# Patient Record
Sex: FEMALE | Race: WHITE | NOT HISPANIC OR LATINO | Employment: UNEMPLOYED | URBAN - METROPOLITAN AREA
[De-identification: names, ages, dates, MRNs, and addresses within clinical notes are randomized per-mention and may not be internally consistent; named-entity substitution may affect disease eponyms.]

---

## 2017-11-14 ENCOUNTER — HOSPITAL ENCOUNTER (EMERGENCY)
Facility: OTHER | Age: 42
Discharge: HOME | End: 2017-11-14
Attending: EMERGENCY MEDICINE
Payer: MEDICAID

## 2017-11-14 VITALS
HEIGHT: 69 IN | SYSTOLIC BLOOD PRESSURE: 133 MMHG | WEIGHT: 293 LBS | RESPIRATION RATE: 17 BRPM | BODY MASS INDEX: 43.4 KG/M2 | TEMPERATURE: 98 F | OXYGEN SATURATION: 97 % | DIASTOLIC BLOOD PRESSURE: 54 MMHG | HEART RATE: 90 BPM

## 2017-11-14 DIAGNOSIS — M25.569 KNEE PAIN: ICD-10-CM

## 2017-11-14 DIAGNOSIS — M17.11 OSTEOARTHRITIS OF RIGHT KNEE, UNSPECIFIED OSTEOARTHRITIS TYPE: Primary | ICD-10-CM

## 2017-11-14 PROCEDURE — 99283 EMERGENCY DEPT VISIT LOW MDM: CPT | Mod: 25

## 2017-11-14 PROCEDURE — 63600175 PHARM REV CODE 636 W HCPCS: Performed by: EMERGENCY MEDICINE

## 2017-11-14 PROCEDURE — 96372 THER/PROPH/DIAG INJ SC/IM: CPT

## 2017-11-14 RX ORDER — DIPHENHYDRAMINE HCL 50 MG
50 CAPSULE ORAL EVERY 6 HOURS PRN
COMMUNITY

## 2017-11-14 RX ORDER — ROSUVASTATIN CALCIUM 20 MG/1
40 TABLET, COATED ORAL DAILY
COMMUNITY

## 2017-11-14 RX ORDER — GABAPENTIN 400 MG/1
400 CAPSULE ORAL 3 TIMES DAILY
COMMUNITY

## 2017-11-14 RX ORDER — PREDNISONE 20 MG/1
TABLET ORAL
Qty: 10 TABLET | Refills: 0 | Status: SHIPPED | OUTPATIENT
Start: 2017-11-14

## 2017-11-14 RX ORDER — PANTOPRAZOLE SODIUM 40 MG/1
40 TABLET, DELAYED RELEASE ORAL DAILY
COMMUNITY

## 2017-11-14 RX ORDER — FUROSEMIDE 40 MG/1
20 TABLET ORAL DAILY
COMMUNITY

## 2017-11-14 RX ORDER — POTASSIUM CHLORIDE 750 MG/1
10 TABLET, EXTENDED RELEASE ORAL ONCE
COMMUNITY

## 2017-11-14 RX ORDER — ERGOCALCIFEROL 1.25 MG/1
50000 CAPSULE ORAL
COMMUNITY

## 2017-11-14 RX ORDER — CHLORTHALIDONE 50 MG/1
TABLET ORAL DAILY
COMMUNITY

## 2017-11-14 RX ORDER — LOSARTAN POTASSIUM 100 MG/1
100 TABLET ORAL DAILY
COMMUNITY

## 2017-11-14 RX ORDER — DOCUSATE SODIUM 250 MG
250 CAPSULE ORAL DAILY
COMMUNITY

## 2017-11-14 RX ORDER — HYDROCODONE BITARTRATE AND ACETAMINOPHEN 7.5; 325 MG/1; MG/1
1 TABLET ORAL EVERY 8 HOURS PRN
Qty: 15 TABLET | Refills: 0 | Status: SHIPPED | OUTPATIENT
Start: 2017-11-14

## 2017-11-14 RX ORDER — BUPROPION HYDROCHLORIDE 300 MG/1
300 TABLET ORAL DAILY
COMMUNITY

## 2017-11-14 RX ORDER — TALC
3 POWDER (GRAM) TOPICAL 3 TIMES DAILY
COMMUNITY

## 2017-11-14 RX ORDER — MELOXICAM 7.5 MG/1
7.5 TABLET ORAL 2 TIMES DAILY
COMMUNITY

## 2017-11-14 RX ORDER — FLUOXETINE HYDROCHLORIDE 40 MG/1
60 CAPSULE ORAL DAILY
COMMUNITY

## 2017-11-14 RX ORDER — KETOROLAC TROMETHAMINE 30 MG/ML
60 INJECTION, SOLUTION INTRAMUSCULAR; INTRAVENOUS
Status: COMPLETED | OUTPATIENT
Start: 2017-11-14 | End: 2017-11-14

## 2017-11-14 RX ADMIN — KETOROLAC TROMETHAMINE 60 MG: 60 INJECTION, SOLUTION INTRAMUSCULAR at 10:11

## 2017-11-14 NOTE — ED PROVIDER NOTES
Encounter Date: 11/14/2017       History     Chief Complaint   Patient presents with    Knee Pain     right knee pain x a while; pt reports she has seen MD and had physical therapy for it before, pain is just getting worse, pt with steady gait     42-year-old morbidly obese female reports right knee pain chronically, but the pain has been worse over the past month.  No recent injuries.  She describes the pain as being deep into the joint.  She reports she's been seen by orthopedist for this in the past and underwent physical therapy.  She also reports she takes Mobitz 7.5 mg twice a day without any relief of her pain.  Denies receiving any injections into the knee, reports last plane film was quite some time ago.          Review of patient's allergies indicates:   Allergen Reactions    Codeine     Iodine and iodide containing products      Past Medical History:   Diagnosis Date    Arthritis     Depression     Fibromyalgia     Gout     Hyperlipemia     Hypertension     Numbness     Plantar fasciitis     Potassium (K) deficiency     Risk for falls     Rosacea     Silent aspiration     Sleep apnea     Vitamin D deficiency      Past Surgical History:   Procedure Laterality Date    BREAST LUMPECTOMY  2000    right breast    HYSTERECTOMY      partial     History reviewed. No pertinent family history.  Social History   Substance Use Topics    Smoking status: Never Smoker    Smokeless tobacco: Never Used    Alcohol use No     Review of Systems   Constitutional: Negative for chills and fever.   Musculoskeletal: Positive for arthralgias and gait problem. Negative for myalgias.   Skin: Negative for color change and wound.       Physical Exam     Initial Vitals [11/14/17 0940]   BP Pulse Resp Temp SpO2   (!) 133/54 90 17 97.9 °F (36.6 °C) 97 %      MAP       80.33         Physical Exam    Nursing note and vitals reviewed.  Constitutional: Vital signs are normal. She appears well-developed and  well-nourished. She is cooperative.   HENT:   Head: Normocephalic and atraumatic.   Cardiovascular: Normal rate.   Pulses:       Dorsalis pedis pulses are 2+ on the right side, and 2+ on the left side.   Trace pitting edema bilateral lower extremities   Musculoskeletal:        Right hip: Normal.        Left hip: Normal.        Right knee: She exhibits decreased range of motion and swelling. She exhibits normal alignment.        Left knee: Normal.        Right ankle: Normal.        Left ankle: Normal.   Right knee flexion to 90°, full extension.  No laxity with varus or valgus stress.  No joint line tenderness.  No laxity of the anterior posterior cruciate ligaments.   Neurological: She is alert and oriented to person, place, and time. She has normal strength. No sensory deficit. Gait abnormal.   Strength flexion and extension of bilateral hips and ankles 5 out of 5.  Gait mildly antalgic.  Sensation intact bilateral lower extremities   Skin: Skin is warm, dry and intact.         ED Course   Procedures  Labs Reviewed - No data to display          Medical Decision Making:   Initial Assessment:   Right knee pain worsening for one month  Differential Diagnosis:   Arthritis; ligamentous injury  ED Management:  Right  knee radiographs    Results: AP, lateral and patellar view. The alignment appears normal.  Moderate medial knee compartment joint space narrowing.  No osteophyte formation.  No geodes. The femoral patellar joint appears normal. No fracture or subluxation.  No osseous lesions.  Small joint effusion.  The soft tissues appear normal.  Impression:     Mild degenerative change.  Small joint effusion    Electronically signed by: MARVIN KINSEY MD  Date: 11/14/17  Time: 11:12     X-ray results and plan a care discussed with patient.  I will give the patient short course of prednisone 40 mg a day for 2 days tapering down to 10 mg a day over 1 week.  We'll also give a short course of Norco 7.5/325 mg.  Advised  patient follow up with [facility] or [facility]opedic surgeon for further evaluation of this knee pain.  She may be a candidate for steroid worsen this injections, but due to body habitus and do not look comfortable initiating intra-articular steroids on this patient.                   ED Course      Clinical Impression:   The primary encounter diagnosis was Osteoarthritis of right knee, unspecified osteoarthritis type. A diagnosis of Knee pain was also pertinent to this visit.    Disposition:   Disposition: Discharged  Condition: Stable                        Olena Katz MD  11/14/17 8394

## 2017-11-14 NOTE — ED TRIAGE NOTES
"Pt reports knee pain is chronic, sees MD at the [facility] for it including ortho and has also had PT for it, states her MD told her there was nothing wrong with it and it was her "depression", pt with steady gait, states pain is worse with ambulation, wants to see an MD because she "can't take the pain anymore"  "

## 2017-11-14 NOTE — ED TRIAGE NOTES
Pt states she has chronic knee pain and c/o pain when ambulation. Pt has steady gait but is painful when ambulating.    Pt denies any other associated symptoms at this time.

## 2017-11-14 NOTE — DISCHARGE INSTRUCTIONS
Prednisone 20 mg tablets - take 2 tablets by mouth daily for 2 days then 1-1/2 tablets by mouth daily for 2 days then 1 tablet by mouth daily for 3 days.    Norco 7.5/325 mg 1 by mouth every 8 hours as needed for pain not responding to meloxicam.    Ice to the medial aspect of the knee for 15 minutes at a time every 4 hours may help.    Continue your routine home medications as prescribed.    Contact your [facility] primary care provider or your orthopedist for further evaluation of your knee pain.    Return as needed.

## 2018-02-02 ENCOUNTER — HOSPITAL ENCOUNTER (EMERGENCY)
Facility: OTHER | Age: 43
Discharge: HOME | End: 2018-02-02
Attending: EMERGENCY MEDICINE
Payer: MEDICAID

## 2018-02-02 VITALS
OXYGEN SATURATION: 99 % | HEART RATE: 80 BPM | HEIGHT: 69 IN | TEMPERATURE: 98 F | SYSTOLIC BLOOD PRESSURE: 149 MMHG | RESPIRATION RATE: 16 BRPM | WEIGHT: 293 LBS | BODY MASS INDEX: 43.4 KG/M2 | DIASTOLIC BLOOD PRESSURE: 80 MMHG

## 2018-02-02 DIAGNOSIS — S93.402A SPRAIN OF LEFT ANKLE, UNSPECIFIED LIGAMENT, INITIAL ENCOUNTER: ICD-10-CM
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
## 2018-02-02 DIAGNOSIS — S90.512A ABRASION OF LEFT ANKLE, INITIAL ENCOUNTER: Primary | ICD-10-CM
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
## 2018-02-02 DIAGNOSIS — S80.812A ABRASION OF LEFT LOWER EXTREMITY, INITIAL ENCOUNTER: ICD-10-CM

## 2018-02-02 PROCEDURE — 25000003 PHARM REV CODE 250: Performed by: NURSE PRACTITIONER

## 2018-02-02 PROCEDURE — 99284 EMERGENCY DEPT VISIT MOD MDM: CPT | Mod: 25

## 2018-02-02 PROCEDURE — 25000003 PHARM REV CODE 250: Performed by: EMERGENCY MEDICINE

## 2018-02-02 PROCEDURE — 63600175 PHARM REV CODE 636 W HCPCS: Performed by: EMERGENCY MEDICINE

## 2018-02-02 PROCEDURE — 90471 IMMUNIZATION ADMIN: CPT | Performed by: EMERGENCY MEDICINE

## 2018-02-02 PROCEDURE — 90715 TDAP VACCINE 7 YRS/> IM: CPT | Performed by: EMERGENCY MEDICINE

## 2018-02-02 PROCEDURE — 96372 THER/PROPH/DIAG INJ SC/IM: CPT

## 2018-02-02 RX ORDER — KETOROLAC TROMETHAMINE 30 MG/ML
30 INJECTION, SOLUTION INTRAMUSCULAR; INTRAVENOUS
Status: COMPLETED | OUTPATIENT
Start: 2018-02-02 | End: 2018-02-02

## 2018-02-02 RX ORDER — METHOCARBAMOL 750 MG/1
1500 TABLET, FILM COATED ORAL 3 TIMES DAILY
Qty: 30 TABLET | Refills: 0 | Status: SHIPPED | OUTPATIENT
Start: 2018-02-02 | End: 2018-02-07

## 2018-02-02 RX ORDER — ACETAMINOPHEN 500 MG
1000 TABLET ORAL EVERY 6 HOURS PRN
Status: DISCONTINUED | OUTPATIENT
Start: 2018-02-02 | End: 2018-02-02 | Stop reason: HOSPADM

## 2018-02-02 RX ORDER — ACETAMINOPHEN 500 MG
500 TABLET ORAL EVERY 6 HOURS PRN
Qty: 30 TABLET | Refills: 0 | Status: SHIPPED | OUTPATIENT
Start: 2018-02-02

## 2018-02-02 RX ORDER — MUPIROCIN 20 MG/G
1 OINTMENT TOPICAL
Status: COMPLETED | OUTPATIENT
Start: 2018-02-02 | End: 2018-02-02

## 2018-02-02 RX ORDER — MUPIROCIN 20 MG/G
OINTMENT TOPICAL 3 TIMES DAILY
Qty: 1 TUBE | Refills: 0 | Status: SHIPPED | OUTPATIENT
Start: 2018-02-02 | End: 2018-02-12

## 2018-02-02 RX ORDER — NAPROXEN 500 MG/1
500 TABLET ORAL 2 TIMES DAILY WITH MEALS
Qty: 30 TABLET | Refills: 0 | Status: SHIPPED | OUTPATIENT
Start: 2018-02-02

## 2018-02-02 RX ADMIN — MUPIROCIN 22 G: 20 OINTMENT TOPICAL at 02:02

## 2018-02-02 RX ADMIN — CLOSTRIDIUM TETANI TOXOID ANTIGEN (FORMALDEHYDE INACTIVATED), CORYNEBACTERIUM DIPHTHERIAE TOXOID ANTIGEN (FORMALDEHYDE INACTIVATED), BORDETELLA PERTUSSIS TOXOID ANTIGEN (GLUTARALDEHYDE INACTIVATED), BORDETELLA PERTUSSIS FILAMENTOUS HEMAGGLUTININ ANTIGEN (FORMALDEHYDE INACTIVATED), BORDETELLA PERTUSSIS PERTACTIN ANTIGEN, AND BORDETELLA PERTUSSIS FIMBRIAE 2/3 ANTIGEN 0.5 ML: 5; 2; 2.5; 5; 3; 5 INJECTION, SUSPENSION INTRAMUSCULAR at 02:02

## 2018-02-02 RX ADMIN — ACETAMINOPHEN 1000 MG: 500 TABLET ORAL at 01:02

## 2018-02-02 RX ADMIN — KETOROLAC TROMETHAMINE 30 MG: 30 INJECTION, SOLUTION INTRAMUSCULAR at 02:02

## 2018-02-09 NOTE — ED PROVIDER NOTES
"Encounter Date: 2/2/2018       History     Chief Complaint   Patient presents with    Ankle Injury     pt states "I fell down my stairs at home this morning and now my left ankle and left knee is swollen and hurting"    Knee Injury     Lyssa Sepulveda is a 42 y.o. female who presents to the Emergency Department with  left ankle and knee pain.  She reports throbbing pain in her lower leg after falling down stairs at home.  Patient does not recall her last tetanus      The history is provided by the patient.   Fall   The accident occurred today. The fall occurred while walking. She fell from a height of 3 to 5 ft. She landed on a hard floor. The point of impact was the left knee. The pain is at a severity of 8/10. She was ambulatory at the scene. There was no entrapment after the fall. There was no drug use involved in the accident. There was no alcohol use involved in the accident. Pertinent negatives include no neck pain, no back pain, no fever, no numbness, no abdominal pain, no nausea, no headaches and no loss of consciousness. The symptoms are aggravated by activity and pressure on the injury. She has tried nothing for the symptoms. The treatment provided no relief.     Review of patient's allergies indicates:   Allergen Reactions    Bactrim [sulfamethoxazole-trimethoprim] Other (See Comments)     Nausea, headache and dizziness    Codeine     Iodine and iodide containing products      Past Medical History:   Diagnosis Date    Arthritis     Depression     Fibromyalgia     Gout     Hyperlipemia     Hypertension     Numbness     Plantar fasciitis     Potassium (K) deficiency     Risk for falls     Rosacea     Silent aspiration     Sleep apnea     Vitamin D deficiency      Past Surgical History:   Procedure Laterality Date    BREAST LUMPECTOMY  2000    right breast    HYSTERECTOMY      partial     History reviewed. No pertinent family history.  Social History   Substance Use Topics    " Smoking status: Never Smoker    Smokeless tobacco: Never Used    Alcohol use No     Review of Systems   Constitutional: Negative for fever.   HENT: Negative for sore throat.    Respiratory: Negative for shortness of breath.    Cardiovascular: Negative for chest pain.   Gastrointestinal: Negative for abdominal pain and nausea.   Genitourinary: Negative for dysuria.   Musculoskeletal: Positive for arthralgias and gait problem. Negative for back pain and neck pain.   Skin: Negative for rash.   Neurological: Negative for loss of consciousness, weakness, numbness and headaches.   Hematological: Does not bruise/bleed easily.   All other systems reviewed and are negative.      Physical Exam     Initial Vitals [02/02/18 1300]   BP Pulse Resp Temp SpO2   (!) 157/83 89 18 97.8 °F (36.6 °C) 97 %      MAP       107.67         Physical Exam    Nursing note and vitals reviewed.  Constitutional: She appears well-developed and well-nourished.   HENT:   Head: Normocephalic and atraumatic.   Right Ear: External ear normal.   Left Ear: External ear normal.   Nose: Nose normal.   Eyes: Conjunctivae and EOM are normal. Pupils are equal, round, and reactive to light. Right eye exhibits no discharge. Left eye exhibits no discharge.   Neck: Normal range of motion. Neck supple.   Cardiovascular: Normal rate, regular rhythm, normal heart sounds and intact distal pulses. Exam reveals no gallop and no friction rub.    No murmur heard.  Pulmonary/Chest: Breath sounds normal. No respiratory distress. She has no wheezes. She has no rhonchi. She has no rales. She exhibits no tenderness.   Abdominal: Soft. Bowel sounds are normal. She exhibits no distension and no mass. There is no tenderness. There is no rebound and no guarding.   Musculoskeletal: She exhibits edema and tenderness.        Right hip: Normal. She exhibits normal range of motion, normal strength and no tenderness.        Left hip: Normal. She exhibits normal range of motion, normal  strength, no tenderness and no bony tenderness.        Right knee: Normal. She exhibits normal range of motion. No tenderness found. No medial joint line and no lateral joint line tenderness noted.        Left knee: She exhibits decreased range of motion and swelling. Tenderness found.        Right ankle: Normal. She exhibits normal range of motion and no swelling. No tenderness. No lateral malleolus and no medial malleolus tenderness found.        Left ankle: She exhibits swelling. Tenderness.   Lymphadenopathy:     She has no cervical adenopathy.   Neurological: She is alert and oriented to person, place, and time. She has normal strength and normal reflexes. She displays normal reflexes. No cranial nerve deficit or sensory deficit.   Skin: Skin is warm and dry. Capillary refill takes less than 2 seconds. Abrasion noted. No rash noted. No pallor.   Psychiatric: She has a normal mood and affect.         ED Course   Procedures  Labs Reviewed - No data to display     Imaging Results          X-Ray Knee 3 View Left (Final result)  Result time 02/02/18 13:23:08    Final result by Matt Mckenzie MD (02/02/18 13:23:08)                 Impression:        No acute bony abnormality.      Electronically signed by: Matt Mckenzie  Date:     02/02/18  Time:    13:23              Narrative:    COMPARISON: None.    FINDINGS: Three views of the left knee and two views of the left tibia/fibula.  No acute fracture or dislocation.  Prominent soft tissues, without definite focal abnormality.  No radiopaque foreign body.                             X-Ray Tibia Fibula 2 View Left (Final result)  Result time 02/02/18 13:23:07    Final result by Matt Mckenzie MD (02/02/18 13:23:07)                 Impression:        No acute bony abnormality.      Electronically signed by: Matt Mckenzie  Date:     02/02/18  Time:    13:23              Narrative:    COMPARISON: None.    FINDINGS: Three views of the left knee and two views of the  left tibia/fibula.  No acute fracture or dislocation.  Prominent soft tissues, without definite focal abnormality.  No radiopaque foreign body.                             X-Ray Ankle Complete Left (Final result)  Result time 02/02/18 13:24:13    Final result by Matt Mckenzie MD (02/02/18 13:24:13)                 Impression:        No acute bony abnormality.      Electronically signed by: Matt Mckenzie  Date:     02/02/18  Time:    13:24              Narrative:    COMPARISON: None.    FINDINGS: Three views of the left ankle.  No acute fracture or dislocation.  Prominent soft tissues, without asymmetric focal abnormality.  No radiopaque foreign body.                             X-Ray Foot Complete Left (Final result)  Result time 02/02/18 13:25:04    Final result by Matt Mckenzie MD (02/02/18 13:25:04)                 Impression:        No acute bony abnormality.      Electronically signed by: Matt Mckenzie  Date:     02/02/18  Time:    13:25              Narrative:    COMPARISON: None.    FINDINGS: Three views of the left foot.  No acute fracture or dislocation.  Soft tissues are symmetric.  No radiopaque foreign body.                                                     ED Course        Medical decision making   Chief complaint: Trip and fall with left ankle and knee pain  Differential diagnosis: Strain, sprain, contusion, and fracture.    Treatment in the ED Physical Exam, Toradol, Bactrim for abrasions, and tetanus update  Patient reports total resolution of symptoms after medication.    Discussed labs, and imaging results.    Fill and take prescriptions as directed.  Return to the ED if symptoms worsen or do not resolve.   Answered questions and discussed discharge plan.    Patient feels much better and is ready for discharge.  Follow up with PCP in 1 days.    Clinical Impression:   The primary encounter diagnosis was Abrasion of left ankle, initial encounter. Diagnoses of Fall, Fall (on)(from)  sidewalk curb, initial encounter, Abrasion of left lower extremity, initial encounter, and Sprain of left ankle, unspecified ligament, initial encounter were also pertinent to this visit.                           Norma Fraser DO  02/10/18 1837

## 2023-01-18 ENCOUNTER — HOSPITAL ENCOUNTER (EMERGENCY)
Facility: HOSPITAL | Age: 48
Discharge: HOME | End: 2023-01-18
Attending: INTERNAL MEDICINE
Payer: MEDICAID

## 2023-01-18 VITALS
HEART RATE: 88 BPM | DIASTOLIC BLOOD PRESSURE: 89 MMHG | OXYGEN SATURATION: 100 % | SYSTOLIC BLOOD PRESSURE: 163 MMHG | RESPIRATION RATE: 17 BRPM | WEIGHT: 293 LBS | TEMPERATURE: 98 F | BODY MASS INDEX: 43.4 KG/M2 | HEIGHT: 69 IN

## 2023-01-18 DIAGNOSIS — M25.511 RIGHT SHOULDER PAIN: ICD-10-CM

## 2023-01-18 PROCEDURE — 25000003 PHARM REV CODE 250: Mod: ER | Performed by: INTERNAL MEDICINE

## 2023-01-18 PROCEDURE — 99283 EMERGENCY DEPT VISIT LOW MDM: CPT | Mod: ER

## 2023-01-18 RX ORDER — METHOCARBAMOL 750 MG/1
1500 TABLET, FILM COATED ORAL 3 TIMES DAILY
Qty: 30 TABLET | Refills: 0 | Status: SHIPPED | OUTPATIENT
Start: 2023-01-18 | End: 2023-01-23

## 2023-01-18 RX ORDER — METHOCARBAMOL 750 MG/1
1500 TABLET, FILM COATED ORAL
Status: COMPLETED | OUTPATIENT
Start: 2023-01-18 | End: 2023-01-18

## 2023-01-18 RX ADMIN — METHOCARBAMOL 1500 MG: 750 TABLET ORAL at 10:01

## 2023-01-18 NOTE — Clinical Note
"Lyssa Camacho" Coco was seen and treated in our emergency department on 1/18/2023.  She may return to school on 01/20/2023.      If you have any questions or concerns, please don't hesitate to call.      MB RN"
Detail Level: Simple
Instructions: This plan will send the code FBSD to the PM system.  DO NOT or CHANGE the price.
Price (Do Not Change): 0.00

## 2023-01-19 NOTE — ED PROVIDER NOTES
Encounter Date: 1/18/2023    SCRIBE #1 NOTE: IKaur, am scribing for, and in the presence of,  Robbie Wen MD.     History     Chief Complaint   Patient presents with    Shoulder Pain     PT REPORTS RIGHT SHOULDER PAIN RADIATING TO POSTERIOR NECK, PT REPORTS TAKING TRAMADOL AND TYLENOL WITHOUT RELIEF     47 y.o. female with PMHx of Arthritis, HTN, HLD, and others who presents to the ED for chief complaint of sharp right sided shoulder pain worsening since going to bed last night. Patient reports sleeping on her side and waking up throughout the night with worsening shoulder pain. She endorses pain radiating up her neck with no radiation of pain down her arm. Pain is exacerbated with movement of her neck. She notes limited neck ROM. Denies any known falls or injury. Patient attempted treatment with Tramadol (last dose 7PM) and Tylenol (last dose 8PM). Denies other associated symptoms, numbness, or tingling. Other pertinent PMHx of CKD stage 4 and left nephrectomy secondary to cancer. Drug allergy to Codeine.    The history is provided by the patient. No  was used.   Review of patient's allergies indicates:   Allergen Reactions    Bactrim [sulfamethoxazole-trimethoprim] Other (See Comments)     Nausea, headache and dizziness    Codeine     Iodine and iodide containing products      PT REPORTS NOT ALLERGIC TO IODINE BUT IS ALLERGIC TO SEAFOOD, REPORTS CAN HAVE IODINE     Past Medical History:   Diagnosis Date    Arthritis     Cancer     Depression     Fibromyalgia     Gout     Hyperlipemia     Hypertension     Numbness     Plantar fasciitis     Potassium (K) deficiency     Risk for falls     Rosacea     Silent aspiration     Sleep apnea     Vitamin D deficiency      Past Surgical History:   Procedure Laterality Date    BREAST LUMPECTOMY  2000    right breast    HYSTERECTOMY      partial    NEPHRECTOMY Left      No family history on file.  Social History     Tobacco Use    Smoking status:  Never    Smokeless tobacco: Never   Substance Use Topics    Alcohol use: No    Drug use: No     Review of Systems   Constitutional:  Negative for fever.   HENT:  Negative for sore throat.    Respiratory:  Negative for shortness of breath.    Cardiovascular:  Negative for chest pain.   Gastrointestinal:  Negative for abdominal pain, diarrhea, nausea and vomiting.   Genitourinary:  Negative for dysuria.   Musculoskeletal:  Positive for arthralgias (R shoulder). Negative for back pain.   Skin:  Negative for rash.   Neurological:  Negative for weakness, numbness and headaches.        (-) Tingling.   Psychiatric/Behavioral:  Negative for behavioral problems.    All other systems reviewed and are negative.    Physical Exam     Initial Vitals [01/18/23 2047]   BP Pulse Resp Temp SpO2   (!) 185/96 91 20 98.3 °F (36.8 °C) 97 %      MAP       --         Physical Exam    Nursing note and vitals reviewed.  Constitutional: She appears well-developed and well-nourished.   Generally obese.    HENT:   Head: Normocephalic and atraumatic.   Eyes: Conjunctivae are normal.   Neck: Neck supple.   Normal range of motion.  Cardiovascular:  Normal rate, regular rhythm and normal heart sounds.     Exam reveals no gallop and no friction rub.       No murmur heard.  Pulmonary/Chest: Breath sounds normal. No respiratory distress. She has no wheezes. She has no rhonchi. She has no rales.   Abdominal: Abdomen is soft. There is no abdominal tenderness.   Musculoskeletal:         General: No edema. Normal range of motion.      Cervical back: Normal range of motion and neck supple.      Comments: R shoulder pain upon movement. No tenderness to palpation. BUE neurovascularly intact.      Neurological: She is alert and oriented to person, place, and time. GCS score is 15. GCS eye subscore is 4. GCS verbal subscore is 5. GCS motor subscore is 6.   Skin: Skin is warm and dry.   Psychiatric: She has a normal mood and affect.       ED Course    Procedures  Labs Reviewed - No data to display       Imaging Results              X-Ray Shoulder Complete 2 View Right (Final result)  Result time 01/18/23 22:26:33      Final result by Leidy Quezada MD (01/18/23 22:26:33)                   Impression:      No acute abnormality.      Electronically signed by: Leidy Quezada  Date:    01/18/2023  Time:    22:26               Narrative:    EXAMINATION:  XR SHOULDER COMPLETE 2 OR MORE VIEWS RIGHT    CLINICAL HISTORY:  Pain in right shoulder    TECHNIQUE:  Two or three views of the right shoulder were performed.    COMPARISON:  None    FINDINGS:  There is no acute fracture or dislocation.  Degenerative changes at the AC joint.  Soft tissues are unremarkable.  Visualized right ribs are unremarkable.                                       Medications   methocarbamoL tablet 1,500 mg (1,500 mg Oral Given 1/18/23 2232)     Medical Decision Making:   History:   Old Medical Records: I decided to obtain old medical records.  Initial Assessment:   47 y.o. female with PMHx of Arthritis, HTN, HLD, and others who presents to the ED for chief complaint of sharp right sided shoulder pain worsening since going to bed last night. Patient reports sleeping on her side and waking up throughout the night with worsening shoulder pain. She endorses pain radiating up her neck with no radiation of pain down her arm. Pain is exacerbated with movement of her neck. She notes limited neck ROM. Denies any known falls or injury. Patient attempted treatment with Tramadol (last dose 7PM) and Tylenol (last dose 8PM). Denies other associated symptoms, numbness, or tingling. Other pertinent PMHx of CKD stage 4 and left nephrectomy secondary to cancer. Drug allergy to Codeine.  Clinical Tests:   Radiological Study: Ordered and Reviewed  ED Management:  Right shoulder x-ray reveals no acute abnormalities.  Patient was given instructions for right shoulder pain and received a prescription for  Robaxin.  She was advised to follow-up with her primary care physician within the next week for re-evaluation/return to the emergency department if condition worsens.        Scribe Attestation:   Scribe #1: I performed the above scribed service and the documentation accurately describes the services I performed. I attest to the accuracy of the note.                 This document was produced by a scribe under my direction and in my presence. I agree with the content of the note and have made any necessary edits.     Dr. Wen    01/19/2023 1:30 AM    Clinical Impression:   Final diagnoses:  [M25.511] Right shoulder pain        ED Disposition Condition    Discharge           ED Prescriptions       Medication Sig Dispense Start Date End Date Auth. Provider    methocarbamoL (ROBAXIN) 750 MG Tab Take 2 tablets (1,500 mg total) by mouth 3 (three) times daily. for 5 days 30 tablet 1/18/2023 1/23/2023 Robbie Wen MD          Follow-up Information    None          Robbie Wen MD  01/19/23 0134

## 2024-02-06 ENCOUNTER — HOSPITAL ENCOUNTER (EMERGENCY)
Facility: HOSPITAL | Age: 49
Discharge: HOME | End: 2024-02-06
Attending: STUDENT IN AN ORGANIZED HEALTH CARE EDUCATION/TRAINING PROGRAM
Payer: MEDICAID

## 2024-02-06 VITALS
WEIGHT: 293 LBS | BODY MASS INDEX: 43.4 KG/M2 | DIASTOLIC BLOOD PRESSURE: 90 MMHG | OXYGEN SATURATION: 98 % | TEMPERATURE: 98 F | SYSTOLIC BLOOD PRESSURE: 194 MMHG | HEIGHT: 69 IN | RESPIRATION RATE: 18 BRPM | HEART RATE: 92 BPM
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
## 2024-02-06 DIAGNOSIS — S46.912A STRAIN OF LEFT SHOULDER, INITIAL ENCOUNTER: ICD-10-CM

## 2024-02-06 PROBLEM — E11.9 DIABETES MELLITUS: Chronic | Status: ACTIVE | Noted: 2024-02-06

## 2024-02-06 PROBLEM — I10 BENIGN ESSENTIAL HYPERTENSION: Status: ACTIVE | Noted: 2024-02-06

## 2024-02-06 PROBLEM — M54.9 CHRONIC BACK PAIN: Status: ACTIVE | Noted: 2024-02-06

## 2024-02-06 PROBLEM — N18.30 CKD (CHRONIC KIDNEY DISEASE) STAGE 3, GFR 30-59 ML/MIN: Chronic | Status: ACTIVE | Noted: 2019-11-14

## 2024-02-06 PROBLEM — E66.01 MORBID OBESITY: Status: ACTIVE | Noted: 2024-02-06

## 2024-02-06 PROBLEM — K59.09 CHRONIC CONSTIPATION: Status: ACTIVE | Noted: 2024-02-06

## 2024-02-06 PROBLEM — M1A.0720 CHRONIC IDIOPATHIC GOUT INVOLVING TOE OF LEFT FOOT WITHOUT TOPHUS: Chronic | Status: ACTIVE | Noted: 2024-02-06

## 2024-02-06 PROBLEM — G89.29 CHRONIC BACK PAIN: Status: ACTIVE | Noted: 2024-02-06

## 2024-02-06 PROBLEM — F41.9 ANXIETY: Chronic | Status: ACTIVE | Noted: 2024-02-06

## 2024-02-06 PROCEDURE — 99281 EMR DPT VST MAYX REQ PHY/QHP: CPT
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
## 2024-02-07 NOTE — DISCHARGE INSTRUCTIONS
Thank you for coming to our Emergency Department today. It is important to remember that some problems or medical conditions are difficult to diagnose and may not be found or addressed during your Emergency Department visit.  These conditions often start with non-specific symptoms and can only be diagnosed on follow up visits with your primary care physician or specialist when the symptoms continue or change. Please remember that all medical conditions can change, and we cannot predict how you will be feeling tomorrow or the next day. Return to the ER with any questions/concerns, new/concerning symptoms, worsening or failure to improve.       Be sure to follow up with your primary care doctor and review all labs/imaging/tests that were performed during your ER visit with them. It is very common for us to identify non-emergent incidental findings which must be followed up with your primary care physician.  Some labs/imaging/tests may be outside of the normal range, and require non-emergent follow-up and/or further investigation/treatment/procedures/testing to help diagnose/exclude/prevent complications or other potentially serious medical conditions. Some abnormalities may not have been discussed or addressed during your ER visit.     An ER visit does not replace a primary care visit, and many screening tests or follow-up tests cannot be ordered by an ER doctor or performed by the ER. Some tests may even require pre-approval.    If you do not have a primary care doctor, you may contact the one listed on your discharge paperwork or you may also call the [facility] Appointment Desk at [phone] , or [address] [place] [address] at  [phone] to schedule an appointment, or establish care with a primary care doctor or even a specialist and to obtain information about local resources. It is important to your health that you have a primary care doctor.    Please take all medications as directed. We have done our best to select  a medication for you that will treat your condition however, all medications may potentially have side-effects and it is impossible to predict which medications may give you side-effects or what those side-effects (if any) those medications may give you.  If you feel that you are having a negative effect or side-effect of any medication you should stop taking those medications immediately and seek medical attention. If you feel that you are having a life-threatening reaction call 911.        Do not drive, swim, climb to height, take a bath, operate heavy machinery, drink alcohol or take potentially sedating medications, sign any legal documents or make any important decisions for 24 hours if you have received any pain medications, sedatives or mood altering drugs during your ER visit or within 24 hours of taking them if they have been prescribed to you.     You can find additional resources for Dentists, hearing aids, durable medical equipment, low cost pharmacies and other resources at [email or web address]

## 2024-12-03 ENCOUNTER — HOSPITAL ENCOUNTER (EMERGENCY)
Facility: HOSPITAL | Age: 49
Discharge: HOME | End: 2024-12-03
Attending: EMERGENCY MEDICINE
Payer: MEDICAID

## 2024-12-03 VITALS
TEMPERATURE: 98 F | WEIGHT: 293 LBS | DIASTOLIC BLOOD PRESSURE: 78 MMHG | RESPIRATION RATE: 20 BRPM | BODY MASS INDEX: 43.4 KG/M2 | HEIGHT: 69 IN | OXYGEN SATURATION: 98 % | HEART RATE: 80 BPM | SYSTOLIC BLOOD PRESSURE: 168 MMHG

## 2024-12-03 DIAGNOSIS — U07.1 COVID-19: Primary | ICD-10-CM

## 2024-12-03 DIAGNOSIS — R05.9 COUGH: ICD-10-CM

## 2024-12-03 LAB
CTP QC/QA: YES
INFLUENZA A ANTIGEN, POC: NEGATIVE
INFLUENZA B ANTIGEN, POC: NEGATIVE
SARS-COV-2 RDRP RESP QL NAA+PROBE: POSITIVE

## 2024-12-03 PROCEDURE — 99284 EMERGENCY DEPT VISIT MOD MDM: CPT | Mod: 25,ER

## 2024-12-03 PROCEDURE — 87635 SARS-COV-2 COVID-19 AMP PRB: CPT | Mod: ER | Performed by: EMERGENCY MEDICINE

## 2024-12-03 PROCEDURE — 87804 INFLUENZA ASSAY W/OPTIC: CPT | Mod: ER

## 2024-12-03 RX ORDER — ACETAMINOPHEN 500 MG
1000 TABLET ORAL EVERY 6 HOURS PRN
Qty: 20 TABLET | Refills: 0 | Status: SHIPPED | OUTPATIENT
Start: 2024-12-03

## 2024-12-03 RX ORDER — BENZONATATE 100 MG/1
100 CAPSULE ORAL 3 TIMES DAILY PRN
Qty: 20 CAPSULE | Refills: 0 | Status: SHIPPED | OUTPATIENT
Start: 2024-12-03 | End: 2024-12-13

## 2024-12-03 RX ORDER — ALBUTEROL SULFATE 90 UG/1
1-2 INHALANT RESPIRATORY (INHALATION) EVERY 6 HOURS PRN
Qty: 8 G | Refills: 0 | Status: SHIPPED | OUTPATIENT
Start: 2024-12-03 | End: 2025-12-03

## 2024-12-03 RX ORDER — FLUTICASONE PROPIONATE 50 MCG
1 SPRAY, SUSPENSION (ML) NASAL 2 TIMES DAILY PRN
Qty: 15 G | Refills: 0 | Status: SHIPPED | OUTPATIENT
Start: 2024-12-03

## 2024-12-03 NOTE — DISCHARGE INSTRUCTIONS
Thank you for coming to our Emergency Department today. It is important to remember that some problems or medical conditions are difficult to diagnose and may not be found during your Emergency Department visit.     Be sure to follow up with your primary care doctor and review all labs/imaging/tests that were performed during your ER visit with them. Some labs/tests may be outside of the normal range and require non-emergent follow-up and further investigation to help diagnose/exclude/prevent complications or other potentially serious medical conditions that were not addressed during your ER visit.    If you do not have a primary care doctor, you may contact the one listed on your discharge paperwork or you may also call the [facility] Appointment Desk at [phone] to schedule an appointment and establish care with one. It is important to your health that you have a primary care doctor.    Please take all medications as directed. All medications may potentially have side-effects and it is impossible to predict which medications may give you side-effects or what side-effects (if any) they will give you.. If you feel that you are having a negative effect or side-effect of any medication you should immediately stop taking them and seek medical attention. If you feel that you are having a life-threatening reaction call 911.    Return to the ER with any questions/concerns, new/concerning symptoms, worsening or failure to improve.     Do not drive, swim, climb to height, take a bath, operate heavy machinery, drink alcohol or take potentially sedating medications, sign any legal documents or make any important decisions for 24 hours if you have received any pain medications, sedatives or mood altering drugs during your ER visit or within 24 hours of taking them if they have been prescribed to you.     You can find additional resources for Dentists, hearing aids, durable medical equipment, low cost pharmacies and  other resources at [email or web address]    BELOW THIS LINE ONLY APPLIES IF YOU HAVE A COVID TEST PENDING OR IF YOU HAVE BEEN DIAGNOSED WITH COVID:  Please access [facility] to review the results of your test. Until the results of your COVID test return, you should isolate yourself so as not to potentially spread illness to others.   If your COVID test returns positive, you should isolate yourself so as not to spread illness to others. After five full days, if you are feeling better and you have not had fever for 24 hours, you can return to your typical daily activities, but you must wear a mask around others for an additional 5 days.   If your COVID test returns negative and you are either unvaccinated or more than six months out from your two-dose vaccine and are not yet boosted, you should still quarantine for 5 full days followed by strict mask use for an additional 5 full days.   If your COVID test returns negative and you have received your 2-dose initial vaccine as well as a booster, you should continue strict mask use for 10 full days after the exposure.  For all those exposed, best practice includes a test at day 5 after the exposure. This can be a home test or a test through one of the many testing centers throughout our community.   Masking is always advised to limit the spread of COVID. Cdc.gov is an excellent site to obtain the latest up to date recommendations regarding COVID and COVID testing.     CDC Testing and Quarantine Guidelines for patients with exposure to a known-positive COVID-19 person:  A close exposure is defined as anyone who has had an exposure (masked or unmasked) to a known COVID -19 positive person within 6 feet of someone for a cumulative total of 15 minutes or more over a 24-hour period.   Vaccinated and/or if you recently had a positive covid test within 90 days do NOT need to quarantine after contact with someone who had COVID-19 unless you develop symptoms.   Fully vaccinated  people who have not had a positive test within 90 days, should get tested 3-5 days after their exposure, even if they don't have symptoms and wear a mask indoors in public for 14 days following exposure or until their test result is negative.      Unvaccinated and/or NOT had a positive test within 90 days and meet close exposure  You are required by CDC guidelines to quarantine for at least 5 days from time of exposure followed by 5 days of strict masking. It is recommended, but not required to test after 5 days, unless you develop symptoms, in which case you should test at that time.  If you get tested after 5 days and your test is positive, your 5 day period of isolation starts the day of the positive test.    If your exposure does not meet the above definition, you can return to your normal daily activities to include social distancing, wearing a mask and frequent handwashing.      Here is a link to guidance from the CDC:  https://www.cdc.gov/media/releases/2021/s1227-isolation-quarantine-guidance.html      [place] Dept [facility] Health Testing Sites:  https://ldh.[place].gov/page/3934      [facility] website with testing locations and guidance:  [email or web address]

## 2024-12-03 NOTE — ED PROVIDER NOTES
"Encounter Date: 12/3/2024    SCRIBE #1 NOTE: I, Geo Delgado, am scribing for, and in the presence of,  Jamaica Price NP.       History     Chief Complaint   Patient presents with    Cough     Cough X A few days   " I just can't handle this cough anymore"      CC: URI symptoms    HPI:  49 y.o. female with PMHx of cancer, fibromyalgia, HLD, HTN, presents to the ED for evaluation of URI symptoms that started x 6 days ago. She reports of cough, congestion, chills, and generalized myalgias. Endorses recent sick contact with family members during thanksgiving. Attempted treatment for symptoms with cough drops. She is afraid to taken OTC medications because she has one kidney. Denies any other associated symptoms. Denies tobacco use or vaping.     The history is provided by the patient. No  was used.     Review of patient's allergies indicates:   Allergen Reactions    Bactrim [sulfamethoxazole-trimethoprim] Other (See Comments)     Nausea, headache and dizziness    Codeine     Iodine and iodide containing products      PT REPORTS NOT ALLERGIC TO IODINE BUT IS ALLERGIC TO SEAFOOD, REPORTS CAN HAVE IODINE     Past Medical History:   Diagnosis Date    Arthritis     Cancer     Depression     Fibromyalgia     Gout     Hyperlipemia     Hypertension     Numbness     Plantar fasciitis     Potassium (K) deficiency     Risk for falls     Rosacea     Silent aspiration     Sleep apnea     Vitamin D deficiency      Past Surgical History:   Procedure Laterality Date    BREAST LUMPECTOMY  2000    right breast    HYSTERECTOMY      partial    NEPHRECTOMY Left      No family history on file.  Social History     Tobacco Use    Smoking status: Never    Smokeless tobacco: Never   Substance Use Topics    Alcohol use: No    Drug use: No     Review of Systems   Constitutional:  Positive for chills. Negative for fever.   HENT:  Positive for congestion. Negative for sore throat and trouble swallowing.    Respiratory:  Positive " for cough. Negative for shortness of breath.    Cardiovascular:  Negative for chest pain.   Gastrointestinal:  Negative for abdominal pain, constipation, diarrhea, nausea and vomiting.   Genitourinary:  Negative for dysuria, flank pain, frequency and urgency.   Musculoskeletal:  Positive for myalgias. Negative for back pain.   Skin:  Negative for rash.   Neurological:  Negative for headaches.       Physical Exam     Initial Vitals [12/03/24 1058]   BP Pulse Resp Temp SpO2   (!) 185/87 72 20 98.7 °F (37.1 °C) 98 %      MAP       --         Physical Exam    Constitutional: She appears well-developed and well-nourished. She is not diaphoretic. No distress.   HENT:   Head: Normocephalic and atraumatic.   Right Ear: Hearing, tympanic membrane, external ear and ear canal normal.   Left Ear: Hearing, tympanic membrane, external ear and ear canal normal.   Nose: Mucosal edema and rhinorrhea present. Mouth/Throat: Posterior oropharyngeal erythema present. No oropharyngeal exudate.   Eyes: Conjunctivae and EOM are normal. Pupils are equal, round, and reactive to light. Right eye exhibits no discharge. Left eye exhibits no discharge.   Neck: Neck supple.   Normal range of motion.  Cardiovascular:  Normal rate, regular rhythm, normal heart sounds and intact distal pulses.           Pulmonary/Chest: Breath sounds normal. No respiratory distress.   Abdominal: Abdomen is soft. Bowel sounds are normal. There is no abdominal tenderness. No hernia. There is no rigidity, no rebound, no guarding, no tenderness at McBurney's point and negative Vargas's sign.   Musculoskeletal:         General: Normal range of motion.      Cervical back: Normal range of motion and neck supple.     Neurological: She is alert and oriented to person, place, and time.   Skin: Skin is warm and dry.   Psychiatric: She has a normal mood and affect. Her behavior is normal.         ED Course   Procedures  Labs Reviewed   SARS-COV-2 RDRP GENE - Abnormal        Result Value    POC Rapid COVID Positive (*)      Acceptable Yes      Narrative:     This test utilizes isothermal nucleic acid amplification technology to detect the SARS-CoV-2 RdRp nucleic acid segment. The analytical sensitivity (limit of detection) is 500 copies/swab.     A POSITIVE result is indicative of the presence of SARS-CoV-2 RNA; clinical correlation with patient history and other diagnostic information is necessary to determine patient infection status.    A NEGATIVE result means that SARS-CoV-2 nucleic acids are not present above the limit of detection. A NEGATIVE result should be treated as presumptive. It does not rule out the possibility of COVID-19 and should not be the sole basis for treatment decisions. If COVID-19 is strongly suspected based on clinical and exposure history, re-testing using an alternate molecular assay should be considered.     Commercial kits are provided by [employer].       POCT INFLUENZA A/B MOLECULAR   POCT RAPID INFLUENZA A/B    Influenza B Ag negative      Inflenza A Ag negative            Imaging Results              X-Ray Chest PA And Lateral (Final result)  Result time 12/03/24 12:34:02      Final result by Jayy Reyes MD (12/03/24 12:34:02)                   Impression:      1. No acute cardiopulmonary process.      Electronically signed by: Jayy Reyes MD  Date:    12/03/2024  Time:    12:34               Narrative:    EXAMINATION:  XR CHEST PA AND LATERAL    CLINICAL HISTORY:  Cough, unspecified    TECHNIQUE:  PA and lateral views of the chest were performed.    COMPARISON:  None    FINDINGS:  The cardiomediastinal silhouette is not enlarged.  There is no pleural effusion.  The trachea is midline.  The lungs are symmetrically expanded bilaterally without evidence of acute parenchymal process. No large focal consolidation seen.  There is no pneumothorax.  The osseous structures are remarkable for degenerative change..                                        Medications - No data to display  Medical Decision Making  This is an evaluation of a 49 y.o. female that presents to the Emergency Department for URI symptoms x 6 days. The patient is a non-toxic, afebrile, and well appearing female. On physical exam ears and pharynx are without evidence of infection. Appears well hydrated with moist mucus membranes. Neck soft and supple with no meningeal signs or cervical lymphadenopathy. Breath sounds are clear and equal bilaterally with no adventitious breath sounds, tachypnea or respiratory distress with room air pulse ox of 98% and no evidence of hypoxia.     Vital Signs Are Reassuring.  RESULTS:  Flu negative.  COVID positive.  Chest x-ray without consolidation.    Outside the window for treatment with Paxlovid.  Will provide supportive care.    My overall impression is COVID-19. I considered, but at this time, do not suspect OM, OE, strep pharyngitis, meningitis, pneumonia, or acute bacterial sinusitis.    ED return precautions were discussed and understanding was verbalized. All questions or concerns have been addressed.     Amount and/or Complexity of Data Reviewed  Labs: ordered. Decision-making details documented in ED Course.  Radiology: ordered. Decision-making details documented in ED Course.    Risk  OTC drugs.  Prescription drug management.            Scribe Attestation:   Scribe #1: I performed the above scribed service and the documentation accurately describes the services I performed. I attest to the accuracy of the note.        ED Course as of 12/03/24 1454   Tue Dec 03, 2024   1152 SARS-CoV-2 RNA, Amplification, Qual(!): Positive [MM]   1242 X-Ray Chest PA And Lateral  FINDINGS:  The cardiomediastinal silhouette is not enlarged.  There is no pleural effusion.  The trachea is midline.  The lungs are symmetrically expanded bilaterally without evidence of acute parenchymal process. No large focal consolidation seen.  There is no  pneumothorax.  The osseous structures are remarkable for degenerative change..     Impression:     1. No acute cardiopulmonary process.      [MM]      ED Course User Index  [MM] Jamaica Price NP                         I, GABRIEL Price, personally performed the services described in this documentation. All medical record entries made by the scribe were at my direction and in my presence. I have reviewed the chart and agree that the record reflects my personal performance and is accurate and complete.      Clinical Impression:  Final diagnoses:  [R05.9] Cough  [U07.1] COVID-19 (Primary)          ED Disposition Condition    Discharge Stable          ED Prescriptions       Medication Sig Dispense Start Date End Date Auth. Provider    albuterol (PROVENTIL/VENTOLIN HFA) 90 mcg/actuation inhaler Inhale 1-2 puffs into the lungs every 6 (six) hours as needed for Wheezing or Shortness of Breath. Rescue 8 g 12/3/2024 12/3/2025 Jamaica Price NP    acetaminophen (TYLENOL) 500 MG tablet Take 2 tablets (1,000 mg total) by mouth every 6 (six) hours as needed for Pain or Temperature greater than (100.4). 20 tablet 12/3/2024 -- Jamaica Price NP    fluticasone propionate (FLONASE) 50 mcg/actuation nasal spray 1 spray (50 mcg total) by Each Nostril route 2 (two) times daily as needed for Rhinitis or Allergies. 15 g 12/3/2024 -- Jamaica Price NP    benzonatate (TESSALON) 100 MG capsule Take 1 capsule (100 mg total) by mouth 3 (three) times daily as needed for Cough. 20 capsule 12/3/2024 12/13/2024 Jamaica Price NP          Follow-up Information       Follow up With Specialties Details Why Contact Info    [facility] -  Schedule an appointment as soon as possible for a visit in 1 day For follow-up if you do not have a primary care doctor [address] [address] [phone]      [place] [facility] ED Emergency Medicine Go to  If symptoms worsen [address] [facility] [address] [phone]              Jamaica Price NP  12/03/24 6208

## 2024-12-05 ENCOUNTER — HOSPITAL ENCOUNTER (EMERGENCY)
Facility: HOSPITAL | Age: 49
Discharge: HOME | End: 2024-12-05
Attending: EMERGENCY MEDICINE
Payer: MEDICAID

## 2024-12-05 VITALS
HEIGHT: 69 IN | TEMPERATURE: 99 F | HEART RATE: 86 BPM | DIASTOLIC BLOOD PRESSURE: 90 MMHG | RESPIRATION RATE: 20 BRPM | SYSTOLIC BLOOD PRESSURE: 140 MMHG | OXYGEN SATURATION: 97 % | BODY MASS INDEX: 43.4 KG/M2 | WEIGHT: 293 LBS

## 2024-12-05 DIAGNOSIS — R05.9 COUGH, UNSPECIFIED TYPE: Primary | ICD-10-CM

## 2024-12-05 DIAGNOSIS — U07.1 COVID-19 VIRUS INFECTION: ICD-10-CM

## 2024-12-05 PROCEDURE — 99284 EMERGENCY DEPT VISIT MOD MDM: CPT | Mod: ER

## 2024-12-05 RX ORDER — ALBUTEROL SULFATE 90 UG/1
2 INHALANT RESPIRATORY (INHALATION) EVERY 4 HOURS PRN
Qty: 18 G | Refills: 0 | Status: SHIPPED | OUTPATIENT
Start: 2024-12-05 | End: 2025-12-05

## 2024-12-05 RX ORDER — GUAIFENESIN AND DEXTROMETHORPHAN HYDROBROMIDE 1200; 60 MG/1; MG/1
1 TABLET, EXTENDED RELEASE ORAL EVERY 12 HOURS
Qty: 14 TABLET | Refills: 0 | Status: SHIPPED | OUTPATIENT
Start: 2024-12-05 | End: 2024-12-12

## 2024-12-05 RX ORDER — IPRATROPIUM BROMIDE 17 UG/1
2 AEROSOL, METERED RESPIRATORY (INHALATION) EVERY 6 HOURS
Qty: 12.9 G | Refills: 0 | Status: SHIPPED | OUTPATIENT
Start: 2024-12-05 | End: 2025-12-05

## 2024-12-05 NOTE — ED PROVIDER NOTES
Encounter Date: 12/5/2024       History     Chief Complaint   Patient presents with    Cough     PT REPORTS DX WITH COVID 2 DAYS AGO AND REPORTS COUGH IS WORSE AND REPORTS HASN'T SLEPT     49F with HTN, HLD, fibromyalgia, hx of cancer, single kidney with CKD, s/p BPD/DS, and diagnosed with COVID 2 days ago, presents with worsening cough. Cough occurs intermittently with intense episodes at times. It is not productive. It keeps her awake at night. She is taking tessalon perles as prescribed with no relief but the pharmacy has not filled her inhaler yet. No fever. I reviewed EMRs and pt was seen in this ER 2 days ago - +COVID, -Flu, and no infiltrate, edema or mass on CXR.       Review of patient's allergies indicates:   Allergen Reactions    Bactrim [sulfamethoxazole-trimethoprim] Other (See Comments)     Nausea, headache and dizziness    Codeine     Iodine and iodide containing products      PT REPORTS NOT ALLERGIC TO IODINE BUT IS ALLERGIC TO SEAFOOD, REPORTS CAN HAVE IODINE     Past Medical History:   Diagnosis Date    Arthritis     Cancer     Depression     Fibromyalgia     Gout     Hyperlipemia     Hypertension     Numbness     Plantar fasciitis     Potassium (K) deficiency     Risk for falls     Rosacea     Silent aspiration     Sleep apnea     Vitamin D deficiency      Past Surgical History:   Procedure Laterality Date    BREAST LUMPECTOMY  2000    right breast    HYSTERECTOMY      partial    NEPHRECTOMY Left      No family history on file.  Social History     Tobacco Use    Smoking status: Never    Smokeless tobacco: Never   Substance Use Topics    Alcohol use: No    Drug use: No     Review of Systems   Constitutional:  Negative for activity change, appetite change, chills and fever.   HENT:  Negative for congestion, rhinorrhea, sneezing and sore throat.    Respiratory:  Positive for cough. Negative for choking, shortness of breath and wheezing.    Cardiovascular:  Negative for chest pain and palpitations.    Gastrointestinal:  Negative for abdominal pain, diarrhea, nausea and vomiting.   Neurological:  Negative for dizziness, syncope, light-headedness and headaches.   All other systems reviewed and are negative.      Physical Exam     Initial Vitals [12/05/24 0621]   BP Pulse Resp Temp SpO2   (!) 140/90 86 20 98.6 °F (37 °C) 97 %      MAP       --         Physical Exam    Nursing note and vitals reviewed.  Constitutional: She appears well-developed and well-nourished. She is Obese . No distress.   HENT:   Head: Normocephalic and atraumatic.   Eyes: Conjunctivae are normal.   Neck:   Normal range of motion.  Cardiovascular:  Normal rate, regular rhythm and normal heart sounds.           No murmur heard.  Pulmonary/Chest: Breath sounds normal. No respiratory distress. She has no wheezes. She has no rhonchi. She has no rales.   Abdominal: Abdomen is soft. Bowel sounds are normal. She exhibits no distension. There is no abdominal tenderness.   Musculoskeletal:         General: No tenderness or edema. Normal range of motion.      Cervical back: Normal range of motion.     Neurological: She is alert and oriented to person, place, and time. GCS score is 15. GCS eye subscore is 4. GCS verbal subscore is 5. GCS motor subscore is 6.   Skin: Skin is warm and dry.   Psychiatric: She has a normal mood and affect. Her behavior is normal.         ED Course   Procedures  Labs Reviewed - No data to display       Imaging Results    None          Medications - No data to display  Medical Decision Making  49F with HTN, HLD, fibromyalgia, hx of cancer, single kidney with CKD, s/p BPD/DS, and diagnosed with COVID 2 days ago, presents with worsening cough. Cough occurs intermittently with intense episodes at times. It is not productive. It keeps her awake at night. She is taking tessalon perles as prescribed with no relief but the pharmacy has not filled her inhaler yet. No fever. I reviewed EMRs and pt was seen in this ER 2 days ago -  +COVID, -Flu, and no infiltrate, edema or mass on CXR.     On exam, no fever, no hypoxia, no wheezing. Pt had CXR 2 days ago with no acute abnormalities. I do not feel need to repeat at this time. In shared decision making with pt, will add albuterol and atrovent inhalers, mucinex dm, and doxylamine to current regimen.     Amount and/or Complexity of Data Reviewed  External Data Reviewed: labs and notes.    Risk  OTC drugs.  Prescription drug management.                                      Clinical Impression:  Final diagnoses:  [R05.9] Cough, unspecified type (Primary)  [U07.1] COVID-19 virus infection          ED Disposition Condition    Discharge Stable          ED Prescriptions       Medication Sig Dispense Start Date End Date Auth. Provider    ipratropium (ATROVENT HFA) 17 mcg/actuation inhaler Inhale 2 puffs into the lungs every 6 (six) hours. Rescue 12.9 g 12/5/2024 12/5/2025 Mago Boswell MD    albuterol (PROAIR HFA) 90 mcg/actuation inhaler Inhale 2 puffs into the lungs every 4 (four) hours as needed (cough). Rescue 18 g 12/5/2024 12/5/2025 Mago Boswell MD    dextromethorphan-guaiFENesin 60-1,200 mg per 12 hr tablet Take 1 tablet by mouth every 12 (twelve) hours. for 7 days 14 tablet 12/5/2024 12/12/2024 Mago Boswell MD    doxylamine succinate 25 mg tablet Take 1 tablet (25 mg total) by mouth nightly as needed (cough). 10 tablet 12/5/2024 12/15/2024 Mago Boswell MD          Follow-up Information       Follow up With Specialties Details Why Contact Info    [facility] ED Emergency Medicine  If symptoms worsen [address] [facility] [address] [phone]             Mago Boswell MD  12/05/24 0297

## 2024-12-05 NOTE — DISCHARGE INSTRUCTIONS
Continue your home medications. Take the newly prescribed medications as directed. Drink lots of water while on these medicines.